# Patient Record
Sex: MALE | URBAN - METROPOLITAN AREA
[De-identification: names, ages, dates, MRNs, and addresses within clinical notes are randomized per-mention and may not be internally consistent; named-entity substitution may affect disease eponyms.]

---

## 2024-09-25 ENCOUNTER — APPOINTMENT (RX ONLY)
Dept: URBAN - METROPOLITAN AREA CLINIC 87 | Facility: CLINIC | Age: 39
Setting detail: DERMATOLOGY
End: 2024-09-25

## 2024-09-25 DIAGNOSIS — L91.0 HYPERTROPHIC SCAR: ICD-10-CM

## 2024-09-25 PROCEDURE — ? COUNSELING

## 2024-09-25 PROCEDURE — ? TREATMENT REGIMEN

## 2024-09-25 PROCEDURE — ? ADDITIONAL NOTES

## 2024-09-25 PROCEDURE — ? PHOTO-DOCUMENTATION

## 2024-09-25 PROCEDURE — 99202 OFFICE O/P NEW SF 15 MIN: CPT

## 2024-09-25 ASSESSMENT — LOCATION ZONE DERM: LOCATION ZONE: ARM

## 2024-09-25 ASSESSMENT — LOCATION DETAILED DESCRIPTION DERM: LOCATION DETAILED: RIGHT POSTERIOR SHOULDER

## 2024-09-25 ASSESSMENT — LOCATION SIMPLE DESCRIPTION DERM: LOCATION SIMPLE: RIGHT SHOULDER

## 2024-09-25 NOTE — HPI: BODY LOCATION - ARM
How Severe Is Your Condition?: moderate
Additional History: Patient reports having a cyst removed January 2024. Patient states the lesion was not bothersome until a few months ago when it became more painful to raise his arm, itchy, and tender.

## 2024-09-25 NOTE — PROCEDURE: TREATMENT REGIMEN
Plan: Patient had cyst removed by Derm in January. Scar noted on exam along with some itching and pain when he stretches his arm. Discussed treatment options and side effects. Patient wishes to try ILK. Patient understands it may not help with the associated pain but can help decrease inflammation from the scar tissue. Case reviewed with Dr. Mallory
Detail Level: Zone

## 2024-09-25 NOTE — PROCEDURE: ADDITIONAL NOTES
Follow-up with Dr. Rdz in 6 months or sooner if needed    Continue all medications    Good [place] with your surgery!  
Detail Level: Simple
Render Risk Assessment In Note?: no
Additional Notes: Case reviewed with Dr. Mallory.\\n\\n\\nTreatment Number: 1\\nLesions Injected: 1\\nMedication Injected: 10.0 mg/cc of Kenalog\\nTotal Volume Injected: .3cc\\n\\nLot Number: [id number]\\nExpiration Date: 3/2026\\nNDC #: 6404-4824-12\\nAdministered by: Sunni Webb PA-C\\n\\nThe risks of atrophy were reviewed with the patient.